# Patient Record
Sex: MALE | Race: WHITE | URBAN - METROPOLITAN AREA
[De-identification: names, ages, dates, MRNs, and addresses within clinical notes are randomized per-mention and may not be internally consistent; named-entity substitution may affect disease eponyms.]

---
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
## 2023-04-11 NOTE — PROCEDURE: MEDICATION COUNSELING
Minoxidil Counseling: Minoxidil is a topical medication which can increase blood flow where it is applied. It is uncertain how this medication increases hair growth. Side effects are uncommon and include stinging and allergic reactions. Clindamycin Pregnancy And Lactation Text: This medication can be used in pregnancy if certain situations. Clindamycin is also present in breast milk.

## 2023-08-17 NOTE — PROCEDURE: MEDICATION COUNSELING
Topical Retinoid counseling:  Patient advised to apply a pea-sized amount only at bedtime and wait 30 minutes after washing their face before applying.  If too drying, patient may add a non-comedogenic moisturizer. The patient verbalized understanding of the proper use and possible adverse effects of retinoids.  All of the patient's questions and concerns were addressed. Complex Repair And Bilobe Flap Text: The defect edges were debeveled with a #15 scalpel blade.  The primary defect was closed partially with a complex linear closure.  Given the location of the remaining defect, shape of the defect and the proximity to free margins a bilobe flap was deemed most appropriate for complete closure of the defect.  Using a sterile surgical marker, an appropriate advancement flap was drawn incorporating the defect and placing the expected incisions within the relaxed skin tension lines where possible.    The area thus outlined was incised deep to adipose tissue with a #15 scalpel blade.  The skin margins were undermined to an appropriate distance in all directions utilizing iris scissors.

## 2023-11-02 NOTE — PROCEDURE: MIPS QUALITY
Patient unable to find Focalin XR 40 in stock for 6+ weeks. Has been on Adderall XR 20 mg #60 in the past. Adderall XR 25 mg #30 and Adderall 30 mg #30 escribed. Notified patient f/u apt in 3-4 weeks. Agrees.          ----- Message from Mamta Orozco MA sent at 11/2/2023  2:18 PM CDT -----  Regarding: send med  [facility] on [address] and airline has 10mg adderall asking for us to send it in.    Agustin-[id number]    
Quality 226: Preventive Care And Screening: Tobacco Use: Screening And Cessation Intervention: Patient screened for tobacco use and is an ex/non-smoker
Detail Level: Detailed
Quality 431: Preventive Care And Screening: Unhealthy Alcohol Use - Screening: Patient not identified as an unhealthy alcohol user when screened for unhealthy alcohol use using a systematic screening method

## 2023-12-28 NOTE — PROCEDURE: MEDICATION COUNSELING
Face Mask Infliximab Counseling:  I discussed with the patient the risks of infliximab including but not limited to myelosuppression, immunosuppression, autoimmune hepatitis, demyelinating diseases, lymphoma, and serious infections.  The patient understands that monitoring is required including a PPD at baseline and must alert us or the primary physician if symptoms of infection or other concerning signs are noted.

## 2025-05-13 NOTE — HPI: MEDICATION (ACCUTANE)
Chief Complaint   Patient presents with   • Office Visit   • Skin Assessment     Referred from:  Trupti Fernández DO / PCP:  Trupti Fernández DO    History of Present Illness:  Colt Hendrix presents for a skin check.  He has nevus on his back and right foot that he specifically would like evaluated today.  He says the lesion on his foot appeared after getting sunburned. He denies a personal or family h/o skin cancer.     Past Dermatologic Specific History:  None    Family History/Social History:   He does not  have a family history of skin cancer.   reports that he has never smoked. He has never used smokeless tobacco.      Medications, the past medical and surgical history were reviewed in the electronic medical record and updated as necessary.     Physical Examination:    Constitutional:  Well developed, well nourished, in no acute distress.    Neurologic and Psychiatric:  He has an appropriate mood and affect.  Alert and oriented x3 with no gross neurological defects.    Musculoskeletal:  Normal gait.  Ocular:  Normal eyelids and conjunctivae.   ORAL:  Inspection of the oropharynx, lips, teeth, and gums reveals no abnormality.   SKIN:  Complete skin examination, including palpation and careful visual examination of the feet, legs, buttocks, back, chest, abdomen, arms, hands, neck, scalp, and face revealed the following significant findings:     Multiple tan to hyperpigmented macules, no ugly duckling, no concerning dermoscopic features  Scattered hyperpigmented macules in sun exposed areas consistent with solar lentigines  Multiple compressible bright red papules consistent with cherry angiomas    Following verbal consent a photo was taken and placed in the patient's chart    Assessment and Plan:  Acral nevus on the right dorsal foot was photographed under dermoscopy - there were no concerning dermocopic features. The lesion on his back was a compound nevus, possible small congenital nevus, this also was not 
How Severe Is It?: severe
Is This A New Presentation, Or A Follow-Up?: Follow Up Accutane
concerning.     Colt was seen today for office visit and skin assessment.    Diagnoses and all orders for this visit:    Skin cancer screening    Multiple nevi    Lentigines    Cherry angioma         Return if symptoms worsen or fail to improve.    On 5/13/2025, Ebony LIRA scribed the services personally performed by Cheko Marin MD   The documentation recorded by the scribe accurately and completely reflects the service(s) I personally performed and the decisions made by me.